# Patient Record
Sex: FEMALE | Race: WHITE | NOT HISPANIC OR LATINO | Employment: OTHER | URBAN - METROPOLITAN AREA
[De-identification: names, ages, dates, MRNs, and addresses within clinical notes are randomized per-mention and may not be internally consistent; named-entity substitution may affect disease eponyms.]

---

## 2024-03-14 ENCOUNTER — APPOINTMENT (EMERGENCY)
Dept: RADIOLOGY | Facility: HOSPITAL | Age: 64
DRG: 072 | End: 2024-03-14
Payer: MEDICARE

## 2024-03-14 ENCOUNTER — APPOINTMENT (EMERGENCY)
Dept: CT IMAGING | Facility: HOSPITAL | Age: 64
DRG: 072 | End: 2024-03-14
Payer: MEDICARE

## 2024-03-14 ENCOUNTER — HOSPITAL ENCOUNTER (INPATIENT)
Facility: HOSPITAL | Age: 64
LOS: 1 days | Discharge: HOME | DRG: 072 | End: 2024-03-15
Attending: EMERGENCY MEDICINE | Admitting: INTERNAL MEDICINE
Payer: MEDICARE

## 2024-03-14 DIAGNOSIS — R41.82 CHANGE IN MENTAL STATUS: Primary | ICD-10-CM

## 2024-03-14 LAB
2HR DELTA HS TROPONIN: 6 NG/L
ANION GAP SERPL CALCULATED.3IONS-SCNC: 7 MMOL/L (ref 4–13)
APTT PPP: 31 SECONDS (ref 23–37)
BUN SERPL-MCNC: 21 MG/DL (ref 5–25)
CALCIUM SERPL-MCNC: 9.7 MG/DL (ref 8.4–10.2)
CARDIAC TROPONIN I PNL SERPL HS: 3 NG/L
CARDIAC TROPONIN I PNL SERPL HS: 9 NG/L
CHLORIDE SERPL-SCNC: 104 MMOL/L (ref 96–108)
CO2 SERPL-SCNC: 29 MMOL/L (ref 21–32)
CREAT SERPL-MCNC: 0.73 MG/DL (ref 0.6–1.3)
ERYTHROCYTE [DISTWIDTH] IN BLOOD BY AUTOMATED COUNT: 12.4 % (ref 11.6–15.1)
FLUAV RNA RESP QL NAA+PROBE: NEGATIVE
FLUBV RNA RESP QL NAA+PROBE: NEGATIVE
GFR SERPL CREATININE-BSD FRML MDRD: 86 ML/MIN/1.73SQ M
GLUCOSE SERPL-MCNC: 88 MG/DL (ref 65–140)
GLUCOSE SERPL-MCNC: 96 MG/DL (ref 65–140)
HCT VFR BLD AUTO: 43.2 % (ref 34.8–46.1)
HGB BLD-MCNC: 13.9 G/DL (ref 11.5–15.4)
INR PPP: 0.94 (ref 0.84–1.19)
MCH RBC QN AUTO: 30.4 PG (ref 26.8–34.3)
MCHC RBC AUTO-ENTMCNC: 32.2 G/DL (ref 31.4–37.4)
MCV RBC AUTO: 95 FL (ref 82–98)
PLATELET # BLD AUTO: 268 THOUSANDS/UL (ref 149–390)
PMV BLD AUTO: 10.1 FL (ref 8.9–12.7)
POTASSIUM SERPL-SCNC: 3.9 MMOL/L (ref 3.5–5.3)
PROTHROMBIN TIME: 13 SECONDS (ref 11.6–14.5)
RBC # BLD AUTO: 4.57 MILLION/UL (ref 3.81–5.12)
RSV RNA RESP QL NAA+PROBE: NEGATIVE
SARS-COV-2 RNA RESP QL NAA+PROBE: NEGATIVE
SODIUM SERPL-SCNC: 140 MMOL/L (ref 135–147)
WBC # BLD AUTO: 8.09 THOUSAND/UL (ref 4.31–10.16)

## 2024-03-14 PROCEDURE — 82948 REAGENT STRIP/BLOOD GLUCOSE: CPT

## 2024-03-14 PROCEDURE — 84484 ASSAY OF TROPONIN QUANT: CPT | Performed by: EMERGENCY MEDICINE

## 2024-03-14 PROCEDURE — 99285 EMERGENCY DEPT VISIT HI MDM: CPT

## 2024-03-14 PROCEDURE — 85730 THROMBOPLASTIN TIME PARTIAL: CPT | Performed by: EMERGENCY MEDICINE

## 2024-03-14 PROCEDURE — 83036 HEMOGLOBIN GLYCOSYLATED A1C: CPT | Performed by: INTERNAL MEDICINE

## 2024-03-14 PROCEDURE — 0241U HB NFCT DS VIR RESP RNA 4 TRGT: CPT | Performed by: EMERGENCY MEDICINE

## 2024-03-14 PROCEDURE — 93005 ELECTROCARDIOGRAM TRACING: CPT

## 2024-03-14 PROCEDURE — 70498 CT ANGIOGRAPHY NECK: CPT

## 2024-03-14 PROCEDURE — 36415 COLL VENOUS BLD VENIPUNCTURE: CPT | Performed by: EMERGENCY MEDICINE

## 2024-03-14 PROCEDURE — 71045 X-RAY EXAM CHEST 1 VIEW: CPT

## 2024-03-14 PROCEDURE — 85610 PROTHROMBIN TIME: CPT | Performed by: EMERGENCY MEDICINE

## 2024-03-14 PROCEDURE — 99285 EMERGENCY DEPT VISIT HI MDM: CPT | Performed by: EMERGENCY MEDICINE

## 2024-03-14 PROCEDURE — NC001 PR NO CHARGE: Performed by: PSYCHIATRY & NEUROLOGY

## 2024-03-14 PROCEDURE — 70496 CT ANGIOGRAPHY HEAD: CPT

## 2024-03-14 PROCEDURE — 85027 COMPLETE CBC AUTOMATED: CPT | Performed by: EMERGENCY MEDICINE

## 2024-03-14 PROCEDURE — 80048 BASIC METABOLIC PNL TOTAL CA: CPT | Performed by: EMERGENCY MEDICINE

## 2024-03-14 PROCEDURE — 96374 THER/PROPH/DIAG INJ IV PUSH: CPT

## 2024-03-14 RX ORDER — ONDANSETRON 2 MG/ML
4 INJECTION INTRAMUSCULAR; INTRAVENOUS ONCE
Status: COMPLETED | OUTPATIENT
Start: 2024-03-14 | End: 2024-03-14

## 2024-03-14 RX ORDER — ASPIRIN 81 MG/1
324 TABLET, CHEWABLE ORAL ONCE
Status: COMPLETED | OUTPATIENT
Start: 2024-03-14 | End: 2024-03-14

## 2024-03-14 RX ADMIN — ASPIRIN 81 MG CHEWABLE TABLET 324 MG: 81 TABLET CHEWABLE at 23:06

## 2024-03-14 RX ADMIN — ONDANSETRON 4 MG: 2 INJECTION INTRAMUSCULAR; INTRAVENOUS at 20:45

## 2024-03-15 ENCOUNTER — APPOINTMENT (OUTPATIENT)
Dept: NON INVASIVE DIAGNOSTICS | Facility: HOSPITAL | Age: 64
DRG: 072 | End: 2024-03-15
Payer: MEDICARE

## 2024-03-15 ENCOUNTER — APPOINTMENT (INPATIENT)
Dept: MRI IMAGING | Facility: HOSPITAL | Age: 64
DRG: 072 | End: 2024-03-15
Payer: MEDICARE

## 2024-03-15 VITALS
DIASTOLIC BLOOD PRESSURE: 67 MMHG | BODY MASS INDEX: 28.61 KG/M2 | OXYGEN SATURATION: 97 % | HEIGHT: 66 IN | WEIGHT: 178 LBS | TEMPERATURE: 97.1 F | SYSTOLIC BLOOD PRESSURE: 129 MMHG | HEART RATE: 72 BPM | RESPIRATION RATE: 18 BRPM

## 2024-03-15 PROBLEM — R41.82 ALTERED MENTAL STATUS: Status: ACTIVE | Noted: 2024-03-15

## 2024-03-15 LAB
4HR DELTA HS TROPONIN: 3 NG/L
AORTIC ROOT: 3.1 CM
APICAL FOUR CHAMBER EJECTION FRACTION: 66 %
ASCENDING AORTA: 2.8 CM
ATRIAL RATE: 59 BPM
ATRIAL RATE: 65 BPM
BACTERIA UR QL AUTO: NORMAL /HPF
BILIRUB UR QL STRIP: NEGATIVE
BSA FOR ECHO PROCEDURE: 1.9 M2
CARDIAC TROPONIN I PNL SERPL HS: 6 NG/L
CHOLEST SERPL-MCNC: 169 MG/DL
CLARITY UR: CLEAR
COLOR UR: YELLOW
E WAVE DECELERATION TIME: 242 MS
E/A RATIO: 1.35
EST. AVERAGE GLUCOSE BLD GHB EST-MCNC: 111 MG/DL
FRACTIONAL SHORTENING: 36 (ref 28–44)
GLUCOSE UR STRIP-MCNC: NEGATIVE MG/DL
HBA1C MFR BLD: 5.5 %
HDLC SERPL-MCNC: 43 MG/DL
HGB UR QL STRIP.AUTO: NEGATIVE
INTERVENTRICULAR SEPTUM IN DIASTOLE (PARASTERNAL SHORT AXIS VIEW): 1 CM
INTERVENTRICULAR SEPTUM: 1 CM (ref 0.6–1.1)
KETONES UR STRIP-MCNC: NEGATIVE MG/DL
LAAS-AP2: 9 CM2
LAAS-AP4: 14.9 CM2
LDLC SERPL CALC-MCNC: 94 MG/DL (ref 0–100)
LEFT ATRIUM SIZE: 2.7 CM
LEFT ATRIUM VOLUME (MOD BIPLANE): 35 ML
LEFT ATRIUM VOLUME INDEX (MOD BIPLANE): 18.4 ML/M2
LEFT INTERNAL DIMENSION IN SYSTOLE: 2.8 CM (ref 2.1–4)
LEFT VENTRICLE DIASTOLIC VOLUME (MOD BIPLANE): 136 ML
LEFT VENTRICLE DIASTOLIC VOLUME INDEX (MOD BIPLANE): 71.6 ML/M2
LEFT VENTRICLE SYSTOLIC VOLUME (MOD BIPLANE): 47 ML
LEFT VENTRICLE SYSTOLIC VOLUME INDEX (MOD BIPLANE): 24.7 ML/M2
LEFT VENTRICULAR INTERNAL DIMENSION IN DIASTOLE: 4.4 CM (ref 3.5–6)
LEFT VENTRICULAR POSTERIOR WALL IN END DIASTOLE: 1 CM
LEFT VENTRICULAR STROKE VOLUME: 60 ML
LEUKOCYTE ESTERASE UR QL STRIP: NEGATIVE
LV EF: 65 %
LVSV (TEICH): 60 ML
MAGNESIUM SERPL-MCNC: 2 MG/DL (ref 1.9–2.7)
MV E'TISSUE VEL-LAT: 14 CM/S
MV E'TISSUE VEL-SEP: 13 CM/S
MV PEAK A VEL: 0.57 M/S
MV PEAK E VEL: 77 CM/S
MV STENOSIS PRESSURE HALF TIME: 70 MS
MV VALVE AREA P 1/2 METHOD: 3.1
NITRITE UR QL STRIP: NEGATIVE
NON-SQ EPI CELLS URNS QL MICRO: NORMAL /HPF
P AXIS: 64 DEGREES
P AXIS: 78 DEGREES
PH UR STRIP.AUTO: 6 [PH]
PR INTERVAL: 124 MS
PR INTERVAL: 146 MS
PROT UR STRIP-MCNC: ABNORMAL MG/DL
QRS AXIS: 73 DEGREES
QRS AXIS: 80 DEGREES
QRSD INTERVAL: 88 MS
QRSD INTERVAL: 90 MS
QT INTERVAL: 384 MS
QT INTERVAL: 418 MS
QTC INTERVAL: 399 MS
QTC INTERVAL: 413 MS
RA PRESSURE ESTIMATED: 3 MMHG
RBC #/AREA URNS AUTO: NORMAL /HPF
RIGHT ATRIUM AREA SYSTOLE A4C: 16.1 CM2
RIGHT VENTRICLE ID DIMENSION: 3.8 CM
RV PSP: 25 MMHG
SL CV LEFT ATRIUM LENGTH A2C: 2.9 CM
SL CV LV EF: 60
SL CV PED ECHO LEFT VENTRICLE DIASTOLIC VOLUME (MOD BIPLANE) 2D: 88 ML
SL CV PED ECHO LEFT VENTRICLE SYSTOLIC VOLUME (MOD BIPLANE) 2D: 29 ML
SP GR UR STRIP.AUTO: 1.02 (ref 1–1.03)
T WAVE AXIS: 39 DEGREES
T WAVE AXIS: 66 DEGREES
TR MAX PG: 22 MMHG
TR PEAK VELOCITY: 2.3 M/S
TRICUSPID ANNULAR PLANE SYSTOLIC EXCURSION: 2.2 CM
TRICUSPID VALVE PEAK REGURGITATION VELOCITY: 2.34 M/S
TRIGL SERPL-MCNC: 161 MG/DL
UROBILINOGEN UR STRIP-ACNC: <2 MG/DL
VENTRICULAR RATE: 59 BPM
VENTRICULAR RATE: 65 BPM
WBC #/AREA URNS AUTO: NORMAL /HPF

## 2024-03-15 PROCEDURE — 93010 ELECTROCARDIOGRAM REPORT: CPT | Performed by: INTERNAL MEDICINE

## 2024-03-15 PROCEDURE — 80061 LIPID PANEL: CPT | Performed by: HOSPITALIST

## 2024-03-15 PROCEDURE — 93005 ELECTROCARDIOGRAM TRACING: CPT

## 2024-03-15 PROCEDURE — 83735 ASSAY OF MAGNESIUM: CPT | Performed by: HOSPITALIST

## 2024-03-15 PROCEDURE — 99205 OFFICE O/P NEW HI 60 MIN: CPT | Performed by: STUDENT IN AN ORGANIZED HEALTH CARE EDUCATION/TRAINING PROGRAM

## 2024-03-15 PROCEDURE — RECHECK: Performed by: INTERNAL MEDICINE

## 2024-03-15 PROCEDURE — 93306 TTE W/DOPPLER COMPLETE: CPT

## 2024-03-15 PROCEDURE — 70551 MRI BRAIN STEM W/O DYE: CPT

## 2024-03-15 PROCEDURE — 84484 ASSAY OF TROPONIN QUANT: CPT | Performed by: EMERGENCY MEDICINE

## 2024-03-15 PROCEDURE — 99223 1ST HOSP IP/OBS HIGH 75: CPT | Performed by: INTERNAL MEDICINE

## 2024-03-15 PROCEDURE — 93306 TTE W/DOPPLER COMPLETE: CPT | Performed by: INTERNAL MEDICINE

## 2024-03-15 PROCEDURE — 81001 URINALYSIS AUTO W/SCOPE: CPT | Performed by: INTERNAL MEDICINE

## 2024-03-15 RX ORDER — LORAZEPAM 2 MG/ML
0.5 INJECTION INTRAMUSCULAR ONCE
Status: COMPLETED | OUTPATIENT
Start: 2024-03-15 | End: 2024-03-15

## 2024-03-15 RX ORDER — ONDANSETRON 2 MG/ML
4 INJECTION INTRAMUSCULAR; INTRAVENOUS EVERY 4 HOURS PRN
Status: DISCONTINUED | OUTPATIENT
Start: 2024-03-15 | End: 2024-03-15 | Stop reason: HOSPADM

## 2024-03-15 RX ORDER — ASPIRIN 81 MG/1
81 TABLET ORAL DAILY
COMMUNITY
End: 2024-03-15

## 2024-03-15 RX ORDER — ATORVASTATIN CALCIUM 40 MG/1
40 TABLET, FILM COATED ORAL EVERY EVENING
Status: DISCONTINUED | OUTPATIENT
Start: 2024-03-15 | End: 2024-03-15 | Stop reason: HOSPADM

## 2024-03-15 RX ORDER — ACETAMINOPHEN 325 MG/1
650 TABLET ORAL EVERY 6 HOURS PRN
Status: DISCONTINUED | OUTPATIENT
Start: 2024-03-15 | End: 2024-03-15 | Stop reason: HOSPADM

## 2024-03-15 RX ADMIN — LORAZEPAM 0.5 MG: 2 INJECTION INTRAMUSCULAR; INTRAVENOUS at 15:56

## 2024-03-15 RX ADMIN — ATORVASTATIN CALCIUM 40 MG: 40 TABLET, FILM COATED ORAL at 17:21

## 2024-03-15 RX ADMIN — ONDANSETRON 4 MG: 2 INJECTION INTRAMUSCULAR; INTRAVENOUS at 08:14

## 2024-03-15 RX ADMIN — ASPIRIN 81 MG: 81 TABLET, COATED ORAL at 08:13

## 2024-03-15 NOTE — ED PROVIDER NOTES
History  Chief Complaint   Patient presents with    STROKE Alert     Per ems  reported notice wife AMS at 1900. Pt became repetitive and didn't remember getting changed or where she was.      63F presents via EMS for acute mental status change.  Per EMS, patient was home and her  noted her to become acutely confused. EMS states that the patient became repetitive with the  and could not remember any thing she did that day and was unable to answer simple questions. EMS did not note any motor or sensory deficit. No witnessed seizure activity at home or by EMS. BS normal for EMS      STROKE Alert      Prior to Admission Medications   Prescriptions Last Dose Informant Patient Reported? Taking?   aspirin (Aspirin 81) 81 mg EC tablet   Yes Yes   Sig: Take 81 mg by mouth daily      Facility-Administered Medications: None       History reviewed. No pertinent past medical history.    History reviewed. No pertinent surgical history.    History reviewed. No pertinent family history.  I have reviewed and agree with the history as documented.    E-Cigarette/Vaping    E-Cigarette Use Never User      E-Cigarette/Vaping Substances     Social History     Tobacco Use    Smoking status: Never    Smokeless tobacco: Never   Vaping Use    Vaping status: Never Used   Substance Use Topics    Alcohol use: Not Currently    Drug use: Never       Review of Systems    Physical Exam  Physical Exam  Vitals and nursing note reviewed.   Constitutional:       General: She is not in acute distress.     Appearance: She is well-developed.   HENT:      Head: Normocephalic and atraumatic.      Right Ear: External ear normal.      Left Ear: External ear normal.   Eyes:      General: No visual field deficit or scleral icterus.     Conjunctiva/sclera: Conjunctivae normal.      Pupils: Pupils are equal, round, and reactive to light.   Cardiovascular:      Rate and Rhythm: Normal rate and regular rhythm.      Heart sounds: Normal heart  sounds.   Pulmonary:      Effort: Pulmonary effort is normal. No respiratory distress.      Breath sounds: Normal breath sounds.   Abdominal:      General: Bowel sounds are normal.      Palpations: Abdomen is soft.      Tenderness: There is no abdominal tenderness. There is no guarding or rebound.   Musculoskeletal:         General: Normal range of motion.      Cervical back: Normal range of motion.   Skin:     General: Skin is warm and dry.      Findings: No rash.   Neurological:      Mental Status: She is alert and oriented to person, place, and time. She is confused.      Cranial Nerves: Cranial nerves 2-12 are intact. No cranial nerve deficit, dysarthria or facial asymmetry.      Sensory: Sensation is intact.      Motor: Motor function is intact.      Coordination: Coordination is intact.   Psychiatric:         Attention and Perception: She is attentive.         Mood and Affect: Mood is not anxious. Affect is flat.         Behavior: Behavior normal. Behavior is cooperative.         Vital Signs  ED Triage Vitals   Temperature Pulse Respirations Blood Pressure SpO2   03/14/24 2020 03/14/24 2020 03/14/24 2020 03/14/24 2020 03/14/24 2020   98 °F (36.7 °C) 75 18 (!) 171/72 98 %      Temp Source Heart Rate Source Patient Position - Orthostatic VS BP Location FiO2 (%)   03/14/24 2020 03/14/24 2020 03/15/24 0907 03/14/24 2023 --   Oral Monitor Lying Right arm       Pain Score       03/14/24 2358       No Pain           Vitals:    03/15/24 0007 03/15/24 0705 03/15/24 0907 03/15/24 0907   BP: 138/60 138/60 104/61 104/61   Pulse: 64 57 67 66   Patient Position - Orthostatic VS:    Lying         Visual Acuity  Visual Acuity      Flowsheet Row Most Recent Value   L Pupil Size (mm) 3   R Pupil Size (mm) 3   L Pupil Shape Round   R Pupil Shape Round            ED Medications  Medications   aspirin (ECOTRIN LOW STRENGTH) EC tablet 81 mg (81 mg Oral Given 3/15/24 0813)   atorvastatin (LIPITOR) tablet 40 mg (has no administration  in time range)   acetaminophen (TYLENOL) tablet 650 mg (has no administration in time range)   ondansetron (ZOFRAN) injection 4 mg (4 mg Intravenous Given 3/15/24 0814)   ondansetron (ZOFRAN) injection 4 mg (4 mg Intravenous Given 3/14/24 2045)   aspirin chewable tablet 324 mg (324 mg Oral Given 3/14/24 2306)       Diagnostic Studies  Results Reviewed       Procedure Component Value Units Date/Time    Hemoglobin A1c w/EAG Estimation [[id number]] Collected: 03/14/24 2025    Lab Status: Final result Specimen: Blood from Arm, Right Updated: 03/15/24 0756     Hemoglobin A1C 5.5 %       mg/dl     HS Troponin I 4hr [[id number]]  (Normal) Collected: 03/15/24 0053    Lab Status: Final result Specimen: Blood from Arm, Left Updated: 03/15/24 0121     hs TnI 4hr 6 ng/L      Delta 4hr hsTnI 3 ng/L     HS Troponin I 2hr [[id number]]  (Normal) Collected: 03/14/24 2311    Lab Status: Final result Specimen: Blood from Arm, Right Updated: 03/14/24 2346     hs TnI 2hr 9 ng/L      Delta 2hr hsTnI 6 ng/L     Protime-INR [[id number]]  (Normal) Collected: 03/14/24 2132    Lab Status: Final result Specimen: Blood from Arm, Right Updated: 03/14/24 2152     Protime 13.0 seconds      INR 0.94    APTT [[id number]]  (Normal) Collected: 03/14/24 2132    Lab Status: Final result Specimen: Blood from Arm, Right Updated: 03/14/24 2152     PTT 31 seconds     FLU/RSV/COVID - if FLU/RSV clinically relevant [[id number]]  (Normal) Collected: 03/14/24 2025    Lab Status: Final result Specimen: Nares from Nose Updated: 03/14/24 2148     SARS-CoV-2 Negative     INFLUENZA A PCR Negative     INFLUENZA B PCR Negative     RSV PCR Negative    Narrative:      FOR PEDIATRIC PATIENTS - copy/paste COVID Guidelines URL to browser: [email or web address]    SARS-CoV-2 assay is a Nucleic Acid Amplification assay intended for the  qualitative detection of nucleic acid from SARS-CoV-2 in nasopharyngeal  swabs.  Results are for the presumptive identification of SARS-CoV-2 RNA.    Positive results are indicative of infection with SARS-CoV-2, the virus  causing COVID-19, but do not rule out bacterial infection or co-infection  with other viruses. Laboratories within the [place] and its  territories are required to report all positive results to the appropriate  public health authorities. Negative results do not preclude SARS-CoV-2  infection and should not be used as the sole basis for treatment or other  patient management decisions. Negative results must be combined with  clinical observations, patient history, and epidemiological information.  This test has not been FDA cleared or approved.    This test has been authorized by FDA under an Emergency Use Authorization  (EUA). This test is only authorized for the duration of time the  declaration that circumstances exist justifying the authorization of the  emergency use of an in vitro diagnostic tests for detection of SARS-CoV-2  virus and/or diagnosis of COVID-19 infection under section 564(b)(1) of  the Act, 21 U.S.C. 360bbb-3(b)(1), unless the authorization is terminated  or revoked sooner. The test has been validated but independent review by FDA  and CLIA is pending.    Test performed using [employer] GeneXpert: This RT-PCR assay targets N2,  a region unique to SARS-CoV-2. A conserved region in the E-gene was chosen  for pan-Sarbecovirus detection which includes SARS-CoV-2.    According to CMS-2020-01-R, this platform meets the definition of high-throughput technology.    HS Troponin 0hr (reflex protocol) [[id number]]  (Normal) Collected: 03/14/24 2025    Lab Status: Final result Specimen: Blood from Arm, Right Updated: 03/14/24 2129     hs TnI 0hr 3 ng/L     Basic metabolic panel [[id number]] Collected: 03/14/24 2025    Lab Status: Final result Specimen: Blood from Arm, Right Updated: 03/14/24 2120     Sodium 140 mmol/L      Potassium 3.9 mmol/L      Chloride 104  mmol/L      CO2 29 mmol/L      ANION GAP 7 mmol/L      BUN 21 mg/dL      Creatinine 0.73 mg/dL      Glucose 88 mg/dL      Calcium 9.7 mg/dL      eGFR 86 ml/min/1.73sq m     Narrative:      National Kidney Disease Foundation guidelines for Chronic Kidney Disease (CKD):     Stage 1 with normal or high GFR (GFR > 90 mL/min/1.73 square meters)    Stage 2 Mild CKD (GFR = 60-89 mL/min/1.73 square meters)    Stage 3A Moderate CKD (GFR = 45-59 mL/min/1.73 square meters)    Stage 3B Moderate CKD (GFR = 30-44 mL/min/1.73 square meters)    Stage 4 Severe CKD (GFR = 15-29 mL/min/1.73 square meters)    Stage 5 End Stage CKD (GFR <15 mL/min/1.73 square meters)  Note: GFR calculation is accurate only with a steady state creatinine    CBC and Platelet [[id number]]  (Normal) Collected: 03/14/24 2025    Lab Status: Final result Specimen: Blood from Arm, Right Updated: 03/14/24 2105     WBC 8.09 Thousand/uL      RBC 4.57 Million/uL      Hemoglobin 13.9 g/dL      Hematocrit 43.2 %      MCV 95 fL      MCH 30.4 pg      MCHC 32.2 g/dL      RDW 12.4 %      Platelets 268 Thousands/uL      MPV 10.1 fL     Fingerstick Glucose (POCT) [[id number]]  (Normal) Collected: 03/14/24 2024    Lab Status: Final result Specimen: Blood Updated: 03/14/24 2025     POC Glucose 96 mg/dl                    CT stroke alert brain   Final Result by Osmel Padgett MD (03/14 2202)      No evidence of acute vascular territorial infarction, intracranial hemorrhage or mass.      Findings were directly discussed with at approximately .      Workstation performed: [id number]         CTA stroke alert (head/neck)   Final Result by Osmel Padgett MD (03/14 2202)      No hemodynamically significant stenosis, dissection or occlusion of the carotid or vertebral arteries or major vessels of the [place].            Findings were directly discussed with  Geneva Bansal   at  10:00 PM  .                           Workstation performed: [id number]          X-ray chest 1 view portable   ED Interpretation by Harry Gutierrez DO (03/14 2106)   My X-ray interpretation of the Chest: was negative for infiltrate, effusion, pneumothorax, or wide mediastinum      Final Result by Kasey gNuyen MD (03/15 1037)      No acute cardiopulmonary disease.            Workstation performed: [id number]         MRI Inpatient Order    (Results Pending)              Procedures  Procedures         ED Course  ED Course as of 03/15/24 1045   Thu Mar 14, 2024   2018 Stroke alert called for confusion beginning acutely at 7p. No focal deficit   2027 Discussed the case with Dr. Bansal, agrees with plan for stroke workup with rapid imaging.   2040 Patient vomiting in CT, zofran ordered   2052 Patient sat up during CTA. Message sent to neuro regarding incomplete CTA     2057 Per discussion with neurology, will reattempt CTA due to concern for transient global amnesia                               SBIRT 20yo+      Flowsheet Row Most Recent Value   Initial Alcohol Screen: US AUDIT-C     1. How often do you have a drink containing alcohol? 0 Filed at: 03/14/2024 2015   2. How many drinks containing alcohol do you have on a typical day you are drinking?  0 Filed at: 03/14/2024 2015   3a. Male UNDER 65: How often do you have five or more drinks on one occasion? 0 Filed at: 03/14/2024 2015   3b. FEMALE Any Age, or MALE 65+: How often do you have 4 or more drinks on one occassion? 0 Filed at: 03/14/2024 2015   Audit-C Score 0 Filed at: 03/14/2024 2015   DANNI: How many times in the past year have you...    Used an illegal drug or used a prescription medication for non-medical reasons? Never Filed at: 03/14/2024 2015                      Medical Decision Making  DDx: stroke, SAH, SDH, epidural, conversion disorder, seizure, transient global amnesia, dehydration, electrolyte disorder    Plan for stroke workup with stat neuro imaging and neuro consultation. Will need admit under medical  service. I discussed with Za from [facility]    Amount and/or Complexity of Data Reviewed  Independent Historian: EMS  Labs: ordered.  Radiology: ordered and independent interpretation performed.    Risk  Prescription drug management.  Decision regarding hospitalization.             Disposition  Final diagnoses:   Change in mental status     Time reflects when diagnosis was documented in both MDM as applicable and the Disposition within this note       Time User Action Codes Description Comment    3/14/2024  8:19 PM Harry Gutierrez Add [R41.82] Change in mental status           ED Disposition       ED Disposition   Admit    Condition   Stable    Date/Time   u Mar 14, 2024 2935    Comment   Case was discussed with [facility] and the patient's admission status was agreed to be Admission Status: observation status to the service of Dr. Amaya .               Follow-up Information    None         Current Discharge Medication List        CONTINUE these medications which have NOT CHANGED    Details   aspirin (Aspirin 81) 81 mg EC tablet Take 81 mg by mouth daily             No discharge procedures on file.    PDMP Review       None            ED Provider  Electronically Signed by             Harry Gutierrez DO  03/15/24 8898

## 2024-03-15 NOTE — PLAN OF CARE
Problem: PAIN - ADULT  Goal: Verbalizes/displays adequate comfort level or baseline comfort level  Description: Interventions:  - Encourage patient to monitor pain and request assistance  - Assess pain using appropriate pain scale  - Administer analgesics based on type and severity of pain and evaluate response  - Implement non-pharmacological measures as appropriate and evaluate response  - Consider cultural and social influences on pain and pain management  - Notify physician/advanced practitioner if interventions unsuccessful or patient reports new pain  Outcome: Progressing     Problem: INFECTION - ADULT  Goal: Absence or prevention of progression during hospitalization  Description: INTERVENTIONS:  - Assess and monitor for signs and symptoms of infection  - Monitor lab/diagnostic results  - Monitor all insertion sites, i.e. indwelling lines, tubes, and drains  - Monitor endotracheal if appropriate and nasal secretions for changes in amount and color  - [place] appropriate cooling/warming therapies per order  - Administer medications as ordered  - Instruct and encourage patient and family to use good hand hygiene technique  - Identify and instruct in appropriate isolation precautions for identified infection/condition  Outcome: Progressing  Goal: Absence of fever/infection during neutropenic period  Description: INTERVENTIONS:  - Monitor WBC    Outcome: Progressing

## 2024-03-15 NOTE — DISCHARGE INSTR - AVS FIRST PAGE
For your PCP:    Mrs. Wallace was admitted for acute onset confusion. Placed on stroke pathway.    MRI as follow:  1. Mild, chronic microangiopathy.  2. No acute infarction, intracranial hemorrhage or midline shift  2. Small retrocerebellar arachnoid cyst on the left.    Echo: EF 60%, normal diastolic function, no valvular issues    Neurology evaluated and believe this was an episode of Transient Global Amnesia and does not require any follow up with neurology. Lipid panel was good therefore she does not require aspirin or statin.

## 2024-03-15 NOTE — ASSESSMENT & PLAN NOTE
Presented to [facility] at about 1900 on 3/14 due to sudden confusion. Per  at bedside patient at baseline throughout the day. No recent illness other than covid in January. Last known normal when she was outside tending to weeds around a bush. Had only been out there for about 20 minutes.   Initially in the ER very confused. Unable to recall what had happened, where she was, year or president. Over the next couple of hours confusion waxing and waning.   At time of admission oriented to person, place and year. States jorge is president. Unable to recall being in the ER or being at home tonight. Remebers tending to the bush by her house.   Takes no medications other than aspirin 81 mg.   Family history of CVA. No prior CVA or tia herself.   CTA head and neck   No hemodynamically significant stenosis, dissection or occlusion of the carotid or vertebral arteries or major vessels of the [place].   MRI  Echocardiogram normal with a EF of 60%  DC aspirin, Plavix, statin-normal cholesterol

## 2024-03-15 NOTE — ASSESSMENT & PLAN NOTE
63 year old female with no prior neurological history per chart review, no prior significant medical history, reported hx of depression not on medication currently,  per chart review who presents to [facility] ED for evaluation of acutely developing a confusional state.  The patient was brought by her  at around 7 PM when she developed decreased awareness and repetitive questioning and unable to recall some events and conversation.  Per record review there was no other focal neurological dysfunction.  No reported seizure activity loss of consciousness head injury or other complaints prior to this event.     A stroke alert was activated, please refer to the stroke alert note completed by neurology.  INH was a 1.  CT of the head showed no acute intracranial abnormalities CTA of the head and neck showed no LVO, as noted below.  The patient was not a TNK candidate as symptoms are nondisabling.  There was a concern for a possible stroke versus TGA per on call neurologist overnight.   The patient was loaded with aspirin and placed on the stroke pathway.     VS on arrival blood pressure was 171/72 mmhg, the patient was afebrile, pulse was 75.    CT of head -No evidence of acute vascular territorial infarction, intracranial hemorrhage or mass.   CTA of head and neck - No hemodynamically significant stenosis, dissection or occlusion of the carotid or vertebral arteries or major vessels of the [place].     Labs/results:  Glucose was 96 on arrival  BMP normal  CMP normal  Flu/RSV/COVID normal  APTT/INR normal  Troponin normal  Mg normal  UA pending  A1c pending  LDL 94    DDX - Suspect TGA, less likely stroke (MRI of brain to clarify), no seizure activity reported (does not appear seizure), the patient was HTN on arrival.  No infectious/metabolic derangements identified at this time.    - Admitted on stroke pathway  MRI brain with out contrast  Hemoglobin A1c  Aspirin 81 mg, the patient was loaded  in the ED (she takes aspirin at home)  Atorvastatin 40 mg, if MRI negative for stroke no need for statin from neuro-perspective  Blood pressure control, goal normotensive  Continue telemetry  PT/OT/ST  No need for EEG at this time, no reported seizure like activity with this event   Frequent neuro checks. Continue to monitor and notify neurology with any changes.   - Medical management and supportive care per primary team. Correction of any metabolic or infectious disturbances.    - Please see attestation for details.   - If MRI negative for stroke no further neurological recommendations.

## 2024-03-15 NOTE — CONSULTS
INTERPROFESSIONAL (PHONE) CONSULTATION   Christi Wallace 63 y.o. female MRN: [id number]  Encounter Date: 03/14/24      Reason for Consult / Principal Problem: stroke alert     Consulting Provider: LIGIA Bansal     Requesting Provider: Dr. Gutierrez       ASSESSMENT:  Mrs. Wallace is a 64 yo female who was brought to [facility] ED for evaluation of acutely developed confusional state. Patient was brought by her  as around 7 pm patient had developed decreased awareness with repetitive questioning and unable to recall some events/conversation. Patient does not have any focal neurologic dysfunction.   No records available for review.   No seizure activity or LOC/head injury described.     Time stroke alert was called: 8:22 pm  Time neurology provider spoke with the ED:  8:23 pm  Patient Last Known Well (LKW) 7:00 pm  BP upon arrival to ED: 171/72 mmHg  PT/INR/aPTT/BS/Platelets: 97/268  NIHS Score: 1    CTH: no acute intracranial pathology noted  CTA head/neck: no LVO noted, no aneurysm/dissection noted    RECOMMENDATIONS:  IV thrombolysis was not given due to patient symptoms are non-disabling. Concern was for TGA vs hippocampal stroke.     Recommend loading aspirin 325 mg PO once, then 81 mg daily  Goal MAP> 100, and SBP < 210 mmHg  LDL Goal <70 mg/dL  Admit to stroke pathway    Total time spent in review of data, discussion with requesting provider and rendering advice was  21-30 minutes, >50% of the total time devoted to medical consultative verbal/EMR discussion between providers. Written report will be generated in the EMR.

## 2024-03-15 NOTE — DISCHARGE SUMMARY
[facility]  Discharge- Christi Wlalace 1960, 63 y.o. female MRN: [id number]  Unit/Bed#: [facility] [place] Encounter: [id number]  Primary Care Provider: Sona Schwartz MD   Date and time admitted to hospital: 3/14/2024  8:09 PM    * Altered mental status - suspect TGA (transient global amenesia)  Assessment & Plan  Presented to [facility] at about 1900 on 3/14 due to sudden confusion. Per  at bedside patient at baseline throughout the day. No recent illness other than covid in January. Last known normal when she was outside tending to weeds around a bush. Had only been out there for about 20 minutes.   Initially in the ER very confused. Unable to recall what had happened, where she was, year or president. Over the next couple of hours confusion waxing and waning.   At time of admission oriented to person, place and year. States jorge is president. Unable to recall being in the ER or being at home tonight. Remebers tending to the bush by her house.   Takes no medications other than aspirin 81 mg.   Family history of CVA. No prior CVA or tia herself.   CTA head and neck   No hemodynamically significant stenosis, dissection or occlusion of the carotid or vertebral arteries or major vessels of the [place].   MRI  Echocardiogram normal with a EF of 60%  DC aspirin, Plavix, statin-normal cholesterol          Medical Problems       Resolved Problems  Date Reviewed: 3/15/2024   None       Discharging Physician / Practitioner: Duncan Mallory MD  PCP: Sona Schwartz MD  Admission Date:   Admission Orders (From admission, onward)       Ordered        03/15/24 1536  Inpatient Admission  Once            03/14/24 2235  Place in Observation  Once                          Discharge Date: 03/15/24    Consultations During Hospital Stay:  Neurology    Procedures Performed:   None    Significant Findings / Test Results:   X-ray chest 1 view portable    Result Date:  "3/15/2024  Impression: No acute cardiopulmonary disease. Workstation performed: [id number]     CT stroke alert brain    Result Date: 3/14/2024  Impression: No evidence of acute vascular territorial infarction, intracranial hemorrhage or mass. Findings were directly discussed with at approximately . Workstation performed: [id number]     CTA stroke alert (head/neck)    Result Date: 3/14/2024  Impression: No hemodynamically significant stenosis, dissection or occlusion of the carotid or vertebral arteries or major vessels of the [place]. Findings were directly discussed with  Geneva Bansal   at  10:00 PM  . Workstation performed: [id number]       X-ray chest 1 view portable    Result Date: 3/15/2024  Impression No acute cardiopulmonary disease. Workstation performed: [id number]         Incidental Findings:   None    Reason for Admission: Altered mental status    Hospital Course:   Christi Wallace is a 63 y.o. female patient who originally presented to the hospital on 3/14/2024 due to mental status.  Stroke alert was activated.  NIH score 1 on presentation.  CT, CTA showed no acute intracranial abnormalities.  Patient was not a TNK candidate.  Patient was loaded with aspirin and placed on stroke pathway.  MRI done.  Echo done, findings as above.  Discussed with patient and  bedside likely this is presentation of transient global amnesia        Please see above list of diagnoses and related plan for additional information.     Condition at Discharge: stable    Discharge Day Visit / Exam:   Subjective: Patient seen and examined at bedside, alert, oriented x 3  Vitals: Blood Pressure: 129/67 (03/15/24 1539)  Pulse: 72 (03/15/24 1539)  Temperature: (!) 97.1 °F (36.2 °C) (03/15/24 1539)  Temp Source: Oral (03/15/24 1539)  Respirations: 18 (03/15/24 1539)  Height: 5' 6\" (167.6 cm) (03/15/24 0705)  Weight - Scale: 80.7 kg (178 lb) (03/15/24 0705)  SpO2: 97 % (03/15/24 1539)  Exam:   Physical Exam "     Gen.-Patient comfortable   Neck- Supple. No thyromegaly or lymphadenopathy  Lungs-Clear bilaterally without any wheeze or rales   Heart S1-S2, regular rate and rhythm, no murmurs  Abdomen-soft nontender, no organomegaly. Bowel sounds present  Extremities-no cyanosi,  clubbing or edema  Skin- no rash  Neuro-nonfocal     Discussion with Family: Updated  () at bedside.    Discharge instructions/Information to patient and family:   See after visit summary for information provided to patient and family.      Provisions for Follow-Up Care:  See after visit summary for information related to follow-up care and any pertinent home health orders.      Mobility at time of Discharge:   Basic Mobility Inpatient Raw Score: 17  JH-HLM Goal: 5: Stand one or more mins  JH-HLM Achieved: 7: Walk 25 feet or more  HLM Goal achieved. Continue to encourage appropriate mobility.     Disposition:   Home    Planned Readmission: None     Discharge Statement:  I spent 32 minutes discharging the patient. This time was spent on the day of discharge. I had direct contact with the patient on the day of discharge. Greater than 50% of the total time was spent examining patient, answering all patient questions, arranging and discussing plan of care with patient as well as directly providing post-discharge instructions.  Additional time then spent on discharge activities.    Discharge Medications:  See after visit summary for reconciled discharge medications provided to patient and/or family.      **Please Note: This note may have been constructed using a voice recognition system**

## 2024-03-15 NOTE — CONSULTS
Consultation - Neurology   Christi Wallace 63 y.o. female MRN: [id number]  Unit/Bed#: -01 Encounter: [id number]    Assessment/Plan   * Altered mental status - suspect TGA (transient global amenesia)  Assessment & Plan  63 year old female with no prior neurological history per chart review, no prior significant medical history, reported hx of depression not on medication currently,  per chart review who presents to [facility] ED for evaluation of acutely developing a confusional state.  The patient was brought by her  at around 7 PM when she developed decreased awareness and repetitive questioning and unable to recall some events and conversation.  Per record review there was no other focal neurological dysfunction.  No reported seizure activity loss of consciousness head injury or other complaints prior to this event.     A stroke alert was activated, please refer to the stroke alert note completed by neurology.  INH was a 1.  CT of the head showed no acute intracranial abnormalities CTA of the head and neck showed no LVO, as noted below.  The patient was not a TNK candidate as symptoms are nondisabling.  There was a concern for a possible stroke versus TGA per on call neurologist overnight.   The patient was loaded with aspirin and placed on the stroke pathway.     VS on arrival blood pressure was 171/72 mmhg, the patient was afebrile, pulse was 75.    CT of head -No evidence of acute vascular territorial infarction, intracranial hemorrhage or mass.   CTA of head and neck - No hemodynamically significant stenosis, dissection or occlusion of the carotid or vertebral arteries or major vessels of the [place].     Labs/results:  Glucose was 96 on arrival  BMP normal  CMP normal  Flu/RSV/COVID normal  APTT/INR normal  Troponin normal  Mg normal  UA pending  A1c pending  LDL 94    DDX - Suspect TGA, less likely stroke (MRI of brain to clarify), no seizure activity reported (does not  appear seizure), the patient was HTN on arrival.  No infectious/metabolic derangements identified at this time.    - Admitted on stroke pathway  MRI brain with out contrast  Hemoglobin A1c  Aspirin 81 mg, the patient was loaded in the ED (she takes aspirin at home)  Atorvastatin 40 mg, if MRI negative for stroke no need for statin from neuro-perspective  Blood pressure control, goal normotensive  Continue telemetry  PT/OT/ST  No need for EEG at this time, no reported seizure like activity with this event   Frequent neuro checks. Continue to monitor and notify neurology with any changes.   - Medical management and supportive care per primary team. Correction of any metabolic or infectious disturbances.    - Please see attestation for details.   - If MRI negative for stroke no further neurological recommendations.      Christi Wallace will need follow up in in 6 weeks with neurovascular attending. She will not require outpatient neurological testing.    History of Present Illness     Reason for Consult / Principal Problem:   Change in mental status    HPI: Christi Wallace is a 63 y.o. with no prior neurological history per chart review, no prior significant medical history per chart review, she reports hx of depression on no medications,  who presents to [facility] ED for evaluation of acutely developing a confusional state.  The patient was brought by her  at around 7 PM when she developed decreased awareness and repetitive questioning and unable to recall some events and conversation.  Per record review there was no other focal neurological dysfunction.  No reported seizure activity loss of consciousness head injury or other complaints prior to this event.     A stroke alert was activated, please refer to the stroke alert note completed by neurology.  INH was a 1.  CT of the head showed no acute intracranial abnormalities CTA of the head and neck showed no LVO, as noted below.  The patient was not a  TNK candidate as symptoms are nondisabling.  There was a concern for a possible stroke versus TGA per on call neurologist overnight.   The patient was loaded with aspirin and placed on the stroke pathway.     VS on arrival blood pressure was 171/72 mmhg, the patient was afebrile, pulse was 75.    CT of head -No evidence of acute vascular territorial infarction, intracranial hemorrhage or mass.   CTA of head and neck - No hemodynamically significant stenosis, dissection or occlusion of the carotid or vertebral arteries or major vessels of the [place].     Labs/results:  Glucose was 96 on arrival  BMP normal  CMP normal  Flu/RSV/COVID normal  APTT/INR normal  Troponin normal  Mg normal  UA pending  A1c pending  LDL 94    The patient was seen by the medical team and neurology was consulted for further recommendations, the patient is on the stroke pathway.     Per patient encounter -  The patient reports she was in the yard doing yard work, digging out a bush/gardening (around 7 p.m.).  That's all she remembers until 10 o'clock last night.  She was confused when she came too at 10 in the evening, she was still confused.  No ha, dizziness, nausea or vomiting, no dizziness or changing speech, she was a little confused afterwards.  She has no weakness, numbness or tingling.  This a.m. she feels slow and tired.  She feels like she is back to normal, she is tired.  She reports the night before this event, she had acid reflux.  She was fine through the day. She reports earlier in the day she was feeling normal. She had a fight that day and had an argument with her .   She is under a lot of stress, as there daughter is getting  at the house and they are prepping the yard for the wedding.     Per  - there was an argument on the yard and what to do next in preparation and what to do gardening.  They were in a disagreement, 20 minutes away from each other, he noticed her digging under the deck, she  seemed fine.   She found her and she was not acting her self/confused and thought she was having a stroke.  She gave him 2 aspirin.  She was confused and her memory was not current in the past, five years in the past.  No focal findings on examination.  She was able to speak, five year lapse of time.   In the ED was nauseous, she was talking normal, no aphasia.  She was repetitive.  She would repeat in minutes.  She started getting her memory back in the hospital, there was a window of time from 7 p.m. to 10 p.m.  Her memories are coming back.  No abnormal smells were reported.     This never happened before, no prior neurological hx.  She does have a hx of depression and was taking off medications, no hx of migraine recently.     She had covid end of Jan, with sense of smell loss but returned.   No recreation drug use  No tobacco abuse  2 beers a night or wine, maybe 1-2 nights a week.     Inpatient consult to Neurology  Consult performed by: Sean Fried PA-C  Consult ordered by: Za Maxwell PA-C      Inpatient consult to Neurology  Consult performed by: Sean Fried PA-C  Consult ordered by: Harry Gutierrez DO        Review of Systems  12 point ROS as per HPI, otherwise negative.     Historical Information   History reviewed. No pertinent past medical history.  History reviewed. No pertinent surgical history.  Social History   Social History     Substance and Sexual Activity   Alcohol Use Not Currently     Social History     Substance and Sexual Activity   Drug Use Never     E-Cigarette/Vaping    E-Cigarette Use Never User      E-Cigarette/Vaping Substances     Social History     Tobacco Use   Smoking Status Never   Smokeless Tobacco Never     Family History: History reviewed. No pertinent family history.    Review of previous medical records was completed no prior neurological history    Meds/Allergies   all current active meds have been reviewed, current meds:   Current Facility-Administered  "Medications   Medication Dose Route Frequency    acetaminophen (TYLENOL) tablet 650 mg  650 mg Oral Q6H PRN    aspirin (ECOTRIN LOW STRENGTH) EC tablet 81 mg  81 mg Oral Daily    atorvastatin (LIPITOR) tablet 40 mg  40 mg Oral QPM    ondansetron (ZOFRAN) injection 4 mg  4 mg Intravenous Q4H PRN   , and PTA meds:   Prior to Admission Medications   Prescriptions Last Dose Informant Patient Reported? Taking?   aspirin (Aspirin 81) 81 mg EC tablet   Yes Yes   Sig: Take 81 mg by mouth daily      Facility-Administered Medications: None     Allergies   Allergen Reactions    Medical Tape Hives     Objective   Vitals:Blood pressure 104/61, pulse 66, temperature (!) 97.4 °F (36.3 °C), temperature source Oral, resp. rate 14, height 5' 6\" (1.676 m), weight 80.7 kg (178 lb), SpO2 97%.,Body mass index is 28.73 kg/m².  No intake or output data in the 24 hours ending 03/15/24 1032    Invasive Devices:   Invasive Devices       Peripheral Intravenous Line  Duration             Peripheral IV 03/15/24 Distal;Right;Ventral (anterior) Forearm <1 day                  Vital Sign reviewed  Constitutional - in NAD, cooperative and pleasant, no meningismus.   HEENT - NC/AT, no icterus noted, conjunctiva clear, oral mucosa free of exudate  Cardiac - Rate regular  Lungs - No respiratory distress noted.   Abdomen - Soft and non tender, non distended  Extremities - No edema noted  Skin - no rashes noted  Neurological  Mental status - the patient is awake alert oriented x 3, with no evidence of aphasia or dysarthria, patient is able to follow simple commands, is able to follow complex commands.  No para-phasic errors noted.  The patient was able to tell us last 3 presidents.   She is able to read, repeat, recognize objects.   She is able to spell world forward and backwards.   She is able to tell us 7 subtracted backwards.   She is able to interpret a proverb.  She is able to name objects.  3/3 immediate, 3/3 intact  Cranial nerves 2 through 12 " "are intact - no nystagmus. VF intact.   Motor - 5/5 upper extremities and lower extremities, without drift, normal tone and bulk.  No drift in the upper or lowers, non focal motor examination.   No tremor or fixed deficit noted  Rapid movements are equal bilaterally  Sensation - non-focal to touch  Coordination -  no ataxia noted on finger-to-nose or heel-to-shin  Reflexes are equal - 2 in the uppers and lowers symmetric, except 3 at patellars bilaterally  Toes are down-going bilaterally  No evidence of clonus or myoclonus or tremor.  No evidence of seizure activity, observed.      (Examined alongside attending)    Lab Results: I have personally reviewed pertinent reports.  , CBC:   Results from last 7 days   Lab Units 03/14/24 2025   WBC Thousand/uL 8.09   RBC Million/uL 4.57   HEMOGLOBIN g/dL 13.9   HEMATOCRIT % 43.2   MCV fL 95   PLATELETS Thousands/uL 268   , BMP/CMP:   Results from last 7 days   Lab Units 03/14/24 2025   SODIUM mmol/L 140   POTASSIUM mmol/L 3.9   CHLORIDE mmol/L 104   CO2 mmol/L 29   BUN mg/dL 21   CREATININE mg/dL 0.73   CALCIUM mg/dL 9.7   EGFR ml/min/1.73sq m 86   , Vitamin B12:   , HgBA1C:   Results from last 7 days   Lab Units 03/14/24 2025   HEMOGLOBIN A1C % 5.5   , TSH:   , Coagulation:   Results from last 7 days   Lab Units 03/14/24  2132   INR  0.94   , Lipid Profile:   Results from last 7 days   Lab Units 03/15/24  0414   HDL mg/dL 43*   LDL CALC mg/dL 94   TRIGLYCERIDES mg/dL 161*   , Ammonia:   , Urinalysis:   Results from last 7 days   Lab Units 03/15/24  0600   COLOR UA  Yellow   CLARITY UA  Clear   SPEC GRAV UA  1.020   PH UA  6.0   LEUKOCYTES UA  Negative   NITRITE UA  Negative   GLUCOSE UA mg/dl Negative   KETONES UA mg/dl Negative   BILIRUBIN UA  Negative   BLOOD UA  Negative   , Drug Screen:   , Medication Drug Levels:       Invalid input(s): \"CARBAMAZEPINE\", \"LACOSAMIDE\", \"OXCARBAZEPINE\"    Per radiology interpretation-  \"  Procedure: CT stroke alert brain    Result Date: " 3/14/2024  Narrative: CT BRAIN - STROKE ALERT PROTOCOL INDICATION:   Stroke Alert. COMPARISON:  None. TECHNIQUE:  CT examination of the brain was performed.  In addition to axial images, coronal reformatted images were created and submitted for interpretation. Radiation dose length product (DLP) for this visit: .  This examination, like all CT scans performed in the [facility], was performed utilizing techniques to minimize radiation dose exposure, including the use of iterative reconstruction  and automated exposure control. IMAGE QUALITY:  Diagnostic. FINDINGS: PARENCHYMA: Periventricular and subcortical hypoattenuating foci consistent with mild microangiopathic disease. No acute intracranial hemorrhage or mass effect. VENTRICLES AND EXTRA-AXIAL SPACES: No hydrocephalus or extra-axial collection. VISUALIZED ORBITS: Intact. PARANASAL SINUSES: Small mucous retention cyst in the right maxillary sinus. CALVARIUM AND EXTRACRANIAL SOFT TISSUES:   No lytic or blastic lesion or fracture.     Impression: No evidence of acute vascular territorial infarction, intracranial hemorrhage or mass. Findings were directly discussed with at approximately . Workstation performed: [id number]     Procedure: CTA stroke alert (head/neck)    Result Date: 3/14/2024  Narrative: CTA NECK AND BRAIN WITH CONTRAST INDICATION: Stroke Alert COMPARISON:   None. TECHNIQUE:   Post contrast imaging was performed after administration of iodinated contrast through the neck and brain. Post contrast axial 0.625 mm images timed to opacify the arterial system. 3D rendering was performed on an independent workstation.   MIP reconstructions performed. Coronal reconstructions were performed of the noncontrast portion of the brain. Radiation dose length product (DLP) for this visit: .  This examination, like all CT scans performed in the [facility], was performed utilizing techniques to minimize radiation dose exposure,  including the use of iterative reconstruction  and automated exposure control. IV Contrast: IMAGE QUALITY:   Diagnostic FINDINGS: CERVICAL VASCULATURE AORTIC ARCH AND GREAT VESSELS: Three-vessel configuration aortic arch. No stenosis in the subclavian arteries. RIGHT VERTEBRAL ARTERY CERVICAL SEGMENT:  Normal origin. The vessel is normal in caliber throughout the neck. LEFT VERTEBRAL ARTERY CERVICAL SEGMENT:  Normal origin. The vessel is normal in caliber throughout the neck. RIGHT EXTRACRANIAL CAROTID SEGMENT:  Normal caliber common carotid artery.  Normal bifurcation and cervical internal carotid artery.  No stenosis or dissection. LEFT EXTRACRANIAL CAROTID SEGMENT:  Normal caliber common carotid artery.  Normal bifurcation and cervical internal carotid artery.  No stenosis or dissection. NASCET criteria was used to determine the degree of internal carotid artery diameter stenosis. INTRACRANIAL VASCULATURE INTERNAL CAROTID ARTERIES: No stenosis in the carotid siphons. ANTERIOR CIRCULATION: Hypoplastic right A1 segment. No stenosis in the anterior cerebral arteries. MIDDLE CEREBRAL ARTERY CIRCULATION:  M1 segment and middle cerebral artery branches demonstrate normal enhancement bilaterally. DISTAL VERTEBRAL ARTERIES:  Normal distal vertebral arteries.  Posterior inferior cerebellar arteries are patent. BASILAR ARTERY:  Basilar artery is normal in caliber. Patent superior cerebellar arteries. POSTERIOR CEREBRAL ARTERIES: Fetal-type right and nearly fetal-type left posterior cerebral arteries without stenosis. VENOUS STRUCTURES: No evidence of dural venous sinus thrombosis. NON VASCULAR ANATOMY BONY STRUCTURES:  No no lytic or blastic lesion. SOFT TISSUES OF THE NECK: No mass or lymphadenopathy. THORACIC INLET: Clear lung apices.     Impression: No hemodynamically significant stenosis, dissection or occlusion of the carotid or vertebral arteries or major vessels of the [place]. Findings were directly  "discussed with  Geneva Bansal   at  10:00 PM  . Workstation performed: [id number]  \"     Imaging Studies: I have personally reviewed pertinent reports.    EKG, Pathology, and Other Studies: I have personally reviewed pertinent reports.      Code Status: Level 1 - Full Code    Counseling / Coordination of Care  Reviewed case with neurology attending, history and physical examination, labs and imaging completed, plan of care as per attending physician.    Please see attestation for further details.    Examined alongside attending physician.    Reviewed with  at bedside.    "

## 2024-03-15 NOTE — ED NOTES
Ct has another pt on table. Waiting to transport pt to ct.      Alethea Peterson, RN  03/14/24 8859

## 2024-03-15 NOTE — PHYSICAL THERAPY NOTE
PHYSICAL THERAPY SCREEN NOTE      Patient Name: Christi Wallace  Today's Date: 3/15/2024       03/15/24 4485   Note Type   Note type Screen   Additional Comments PT orders received, chart review performed. Contact made w/ patient and  who both report pt is taking herself to/from bathroom and has no mobility concerns, state her cognition and memory are much improved from yetserday. No concerns w/ DC home, will DC PT       Kelsi Tapia, PT, DPT

## 2024-03-15 NOTE — CASE MANAGEMENT
Case Management Assessment & Discharge Planning Note    Patient name Christi Wallace  Location /-01 MRN [id number]  : 1960 Date 3/15/2024       Current Admission Date: 3/14/2024  Current Admission Diagnosis:Altered mental status - suspect TGA (transient global amenesia)   Patient Active Problem List    Diagnosis Date Noted    Altered mental status - suspect TGA (transient global amenesia) 03/15/2024      LOS (days): 0  Geometric Mean LOS (GMLOS) (days):   Days to GMLOS:     OBJECTIVE:              Current admission status: Observation       Preferred Pharmacy:   [id number] - [place] - [address]  [address] [address]  Phone: [phone] Fax: [phone]    Primary Care Provider: Sona Schwartz MD    Primary Insurance: MEDICARE  Secondary Insurance: COMMERCIAL MISCELLANEOUS    ASSESSMENT:  Active Health Care Proxies       Stanton Wallace Health Care Representative - Spouse   Primary Phone: [phone] (Mobile)                 Advance Directives  Does patient have a Health Care POA?: No  Was patient offered paperwork?: Yes (declined)  Does patient currently have a Health Care decision maker?: Yes, please see Health Care Proxy section  Does patient have Advance Directives?: No  Was patient offered paperwork?: Yes (declined)    Readmission Root Cause  30 Day Readmission: No    Patient Information  Admitted from:: Home  Mental Status: Alert  During Assessment patient was accompanied by: Not accompanied during assessment  Assessment information provided by:: Patient  Primary Caregiver: Self  Support Systems: Self, Spouse/significant other, Daughter  Home entry access options. Select all that apply.: Stairs  Number of steps to enter home.: 3  Type of Current Residence: 2 story home  Upon entering residence, is there a bedroom on the main floor (no further steps)?: No  A bedroom is located on the following floor levels of residence (select  all that apply):: 2nd Floor  Upon entering residence, is there a bathroom on the main floor (no further steps)?: Yes  Number of steps to 2nd floor from main floor: One Flight  Living Arrangements: Lives w/ Spouse/significant other  Is patient a ?: No    Activities of Daily Living Prior to Admission  Functional Status: Independent  Completes ADLs independently?: Yes  Ambulates independently?: Yes  Does patient use assisted devices?: No  Does patient currently own DME?: No  Does patient have a history of Outpatient Therapy (PT/OT)?: No  Does the patient have a history of Short-Term Rehab?: No  Does patient have a history of HHC?: No  Does patient currently have HHC?: No    Patient Information Continued  Does patient have prescription coverage?: Yes  Does patient receive dialysis treatments?: No  Does patient have a history of substance abuse?: No  Does patient have a history of Mental Health Diagnosis?: No    Means of Transportation  Means of Transport to Appts:: Drives Self      Social Determinants of Health (SDOH)      Flowsheet Row Most Recent Value   Housing Stability    In the last 12 months, was there a time when you were not able to pay the mortgage or rent on time? N   In the last 12 months, how many places have you lived? 1   In the last 12 months, was there a time when you did not have a steady place to sleep or slept in a shelter (including now)? N   Transportation Needs    In the past 12 months, has lack of transportation kept you from medical appointments or from getting medications? no   In the past 12 months, has lack of transportation kept you from meetings, work, or from getting things needed for daily living? No   Food Insecurity    Within the past 12 months, you worried that your food would run out before you got the money to buy more. Never true   Within the past 12 months, the food you bought just didn't last and you didn't have money to get more. Never true   Utilities    In the past 12  months has the electric, gas, oil, or water company threatened to shut off services in your home? No          DISCHARGE DETAILS:    Discharge planning discussed with:: patient  Freedom of Choice: Yes  Comments - Freedom of Choice: no anticipated dc needs  CM contacted family/caregiver?: No- see comments (reports being in contact with family)  Were Treatment Team discharge recommendations reviewed with patient/caregiver?: Yes  Did patient/caregiver verbalize understanding of patient care needs?: Yes  Were patient/caregiver advised of the risks associated with not following Treatment Team discharge recommendations?: Yes    Requested Home Health Care         Is the patient interested in HHC at discharge?: No    DME Referral Provided  Referral made for DME?: No    Treatment Team Recommendation: Home  Discharge Destination Plan:: Home  Transport at Discharge : Family     Additional Comments: Met with pt to discuss the role of CM and to discuss any help pt may need prior to dc. Pt lives with her  Stanton in a 2 story home with 3 JOSE LUIS; bedroom on the 2nd floor. Pt performed ADL's indptly pta, no use of DME. No hx of HHC or rehab. No hx of mental health or D&A treatment. Pt's preferred pharmacy is [email or web address]. Pt drives. Pt's daughter Desirae will transport home at dc.

## 2024-03-15 NOTE — H&P
[facility]  H&P  Name: Christi Wallace 63 y.o. female I MRN: [id number]  Unit/Bed#: -01 I Date of Admission: 3/14/2024   Date of Service: 3/15/2024 I Hospital Day: 0      Assessment/Plan   * Altered mental status  Assessment & Plan  Presented to [facility] at about 1900 on 3/14 due to sudden confusion. Per  at bedside patient at baseline throughout the day. No recent illness other than covid in January. Last known normal when she was outside tending to weeds around a bush. Had only been out there for about 20 minutes.   Initially in the ER very confused. Unable to recall what had happened, where she was, year or president. Over the next couple of hours confusion waxing and waning.   At time of admission oriented to person, place and year. States jorge is president. Unable to recall being in the ER or being at home tonight. Remebers tending to the bush by her house.   Takes no medications other than aspirin 81 mg.   Family history of CVA. No prior CVA or tia herself.   CTA head and neck   No hemodynamically significant stenosis, dissection or occlusion of the carotid or vertebral arteries or major vessels of the [place] of [place].   ED contacted neurology concern for TGA vs hippocampal stroke   Recommended loading with aspirin 325 mg, then 81 mg once   Allow for permissive HTN   Stroke pathway (MRI brain/ECHO)  Denies tobacco or alcohol abuse   Labs unremarkable. Does not appear infections. Obtain UA   Covid/flu/rsv negative  Neuro checks   Continue to reorient as able  Consult CM and neuro           VTE Pharmacologic Prophylaxis: VTE Score: 2 Low Risk (Score 0-2) - Encourage Ambulation.  Code Status: Level 1 - Full Code   Discussion with family: Updated  ( and daughter) at bedside.    Anticipated Length of Stay: Patient will be admitted on an observation basis with an anticipated length of stay of less than 2 midnights secondary to AMS.    Total Time Spent on  Date of Encounter in care of patient: 55 mins. This time was spent on one or more of the following: performing physical exam; counseling and coordination of care; obtaining or reviewing history; documenting in the medical record; reviewing/ordering tests, medications or procedures; communicating with other healthcare professionals and discussing with patient's family/caregivers.    Chief Complaint: Confused     History of Present Illness:  Christi Wallace is a 63 y.o. female with no PMH who presents from home with  due to acute confusion.  Per  at bedside patient had been at her baseline throughout the day.  No recent illness other than COVID in January.  Patient was last noted to be normal a little bit before 6 when she went outside to tend to bushes and pull out weeds by their house.  She was only outside for approximately 20 minutes when the confusion began.  Per  confusion was severe, initially unable to talk or provide any input.  In the ER unable to recall current year, president or where she was.  Confused of why she was there.  Per family at bedside patient was also confused about her age, thought she was 58 years old instead of 63.  Stroke alert in the ER.  CT HEAD AND CTA without acute signs of stroke/dissection/occlusion.  Neurology recommending stroke pathway.     At time of admission she is now oriented to person, place and year.  States Rod is president.  However unable to recall being in the ER, meeting ED provider or being at home tonight.  Remembers tending to the bushes by her house that is the last thing.  Per family her confusion appears to be waxing and waning throughout the night.  No prior cognitive issues.  No prior TIAs/CVAs.     No new medications.  Takes aspirin 81 mg daily.    Denies tobacco or alcohol abuse.     Review of Systems:  Review of Systems   Constitutional:  Negative for fatigue and fever.   HENT:  Negative for sore throat.    Respiratory:  Negative for  "cough, chest tightness and shortness of breath.    Cardiovascular:  Negative for chest pain.   Gastrointestinal:  Negative for abdominal distention, abdominal pain, diarrhea, nausea and vomiting.   Genitourinary:  Negative for difficulty urinating.   Musculoskeletal:  Negative for arthralgias.   Neurological:  Negative for weakness and headaches.   Psychiatric/Behavioral:  Positive for confusion. Negative for agitation and behavioral problems.    All other systems reviewed and are negative.      Past Medical and Surgical History:   History reviewed. No pertinent past medical history.    History reviewed. No pertinent surgical history.    Meds/Allergies:  Prior to Admission medications    Medication Sig Start Date End Date Taking? Authorizing Provider   aspirin (Aspirin 81) 81 mg EC tablet Take 81 mg by mouth daily   Yes Historical Provider, MD DUKES have reviewed home medications with patient personally.    Allergies:   Allergies   Allergen Reactions    Medical Tape Hives       Social History:  Marital Status: /Civil Union   Occupation: Unknown  Patient Pre-hospital Living Situation: Home  Patient Pre-hospital Level of Mobility: walks  Patient Pre-hospital Diet Restrictions: Regular  Substance Use History:   Social History     Substance and Sexual Activity   Alcohol Use Not Currently     Social History     Tobacco Use   Smoking Status Never   Smokeless Tobacco Never     Social History     Substance and Sexual Activity   Drug Use Never       Family History:  History reviewed. No pertinent family history.    Physical Exam:     Vitals:   Blood Pressure: 138/60 (03/15/24 0007)  Pulse: 64 (03/15/24 0007)  Temperature: 97.9 °F (36.6 °C) (03/15/24 0007)  Temp Source: Oral (03/14/24 2023)  Respirations: 20 (03/14/24 2315)  Height: 5' 6\" (167.6 cm) (03/14/24 2357)  Weight - Scale: 80.7 kg (178 lb) (03/14/24 2357)  SpO2: 97 % (03/15/24 0007)    Physical Exam  Vitals and nursing note reviewed.   Constitutional:       " Appearance: Normal appearance.   HENT:      Head: Normocephalic.   Eyes:      Extraocular Movements: Extraocular movements intact.      Pupils: Pupils are equal, round, and reactive to light.   Cardiovascular:      Rate and Rhythm: Normal rate and regular rhythm.      Heart sounds: No murmur heard.  Pulmonary:      Effort: Pulmonary effort is normal. No respiratory distress.      Breath sounds: Normal breath sounds. No wheezing.   Abdominal:      General: Bowel sounds are normal. There is no distension.      Tenderness: There is no abdominal tenderness. There is no guarding.   Musculoskeletal:         General: Normal range of motion.      Cervical back: Normal range of motion.      Right lower leg: No edema.      Left lower leg: No edema.   Skin:     General: Skin is warm.   Neurological:      General: No focal deficit present.      Mental Status: She is alert. She is disoriented.   Psychiatric:         Mood and Affect: Mood normal.         Speech: Speech normal.         Behavior: Behavior normal.         Cognition and Memory: She exhibits impaired recent memory. She does not exhibit impaired remote memory.          Additional Data:     Lab Results:  Results from last 7 days   Lab Units 03/14/24 2025   WBC Thousand/uL 8.09   HEMOGLOBIN g/dL 13.9   HEMATOCRIT % 43.2   PLATELETS Thousands/uL 268     Results from last 7 days   Lab Units 03/14/24 2025   SODIUM mmol/L 140   POTASSIUM mmol/L 3.9   CHLORIDE mmol/L 104   CO2 mmol/L 29   BUN mg/dL 21   CREATININE mg/dL 0.73   ANION GAP mmol/L 7   CALCIUM mg/dL 9.7   GLUCOSE RANDOM mg/dL 88     Results from last 7 days   Lab Units 03/14/24  2132   INR  0.94     Results from last 7 days   Lab Units 03/14/24 2024   POC GLUCOSE mg/dl 96               Lines/Drains:  Invasive Devices       Peripheral Intravenous Line  Duration             Peripheral IV 03/15/24 Distal;Right;Ventral (anterior) Forearm <1 day                        Imaging: Reviewed radiology reports from this  admission including: CT head  CT stroke alert brain   Final Result by Osmel Padgett MD (03/14 2202)      No evidence of acute vascular territorial infarction, intracranial hemorrhage or mass.      Findings were directly discussed with at approximately .      Workstation performed: [id number]         CTA stroke alert (head/neck)   Final Result by Osmel Padgett MD (03/14 2202)      No hemodynamically significant stenosis, dissection or occlusion of the carotid or vertebral arteries or major vessels of the [place].            Findings were directly discussed with  Geneva Bansal   at  10:00 PM  .                           Workstation performed: [id number]         X-ray chest 1 view portable   ED Interpretation by Harry Gutierrez DO (03/14 2106)   My X-ray interpretation of the Chest: was negative for infiltrate, effusion, pneumothorax, or wide mediastinum      MRI Inpatient Order    (Results Pending)       EKG and Other Studies Reviewed on Admission:   EKG:  Ordered.    ** Please Note: This note has been constructed using a voice recognition system. **

## 2024-03-15 NOTE — OCCUPATIONAL THERAPY NOTE
Occupational Therapy Screen Note     Patient Name: Christi Wallace  Today's Date: 3/15/2024  Problem List  Principal Problem:    Altered mental status - suspect TGA (transient global amenesia)              03/15/24 1416   OT Last Visit   OT Visit Date 03/15/24   Note Type   Note type Screen   Additional Comments OT orders received, chart reviewed. Spoke with pt &  directly. Pt reports taking self <> BR. Pt reports no concerns for going home & completing ADLs & functional txfers/mobility independently. Pt reports her memory feels significantly better. DC OT Orders.       Flora Hartmann, OTR/L

## 2024-03-15 NOTE — ASSESSMENT & PLAN NOTE
Presented to [facility] at about 1900 on 3/14 due to sudden confusion. Per  at bedside patient at baseline throughout the day. No recent illness other than covid in January. Last known normal when she was outside tending to weeds around a bush. Had only been out there for about 20 minutes.   Initially in the ER very confused. Unable to recall what had happened, where she was, year or president. Over the next couple of hours confusion waxing and waning.   At time of admission oriented to person, place and year. States jorge is president. Unable to recall being in the ER or being at home tonight. Remebers tending to the bush by her house.   Takes no medications other than aspirin 81 mg.   Family history of CVA. No prior CVA or tia herself.   CTA head and neck   No hemodynamically significant stenosis, dissection or occlusion of the carotid or vertebral arteries or major vessels of the [place] of [place].   ED contacted neurology concern for TGA vs hippocampal stroke   Recommended loading with aspirin 325 mg, then 81 mg once   Allow for permissive HTN   Stroke pathway (MRI brain/ECHO)  Denies tobacco or alcohol abuse   Labs unremarkable. Does not appear infections. Obtain UA   Covid/flu/rsv negative  Neuro checks   Continue to reorient as able  Consult CM and neuro

## 2024-03-15 NOTE — PLAN OF CARE
Problem: PAIN - ADULT  Goal: Verbalizes/displays adequate comfort level or baseline comfort level  Description: Interventions:  - Encourage patient to monitor pain and request assistance  - Assess pain using appropriate pain scale  - Administer analgesics based on type and severity of pain and evaluate response  - Implement non-pharmacological measures as appropriate and evaluate response  - Consider cultural and social influences on pain and pain management  - Notify physician/advanced practitioner if interventions unsuccessful or patient reports new pain  Outcome: Progressing     Problem: INFECTION - ADULT  Goal: Absence or prevention of progression during hospitalization  Description: INTERVENTIONS:  - Assess and monitor for signs and symptoms of infection  - Monitor lab/diagnostic results  - Monitor all insertion sites, i.e. indwelling lines, tubes, and drains  - Monitor endotracheal if appropriate and nasal secretions for changes in amount and color  - [place] appropriate cooling/warming therapies per order  - Administer medications as ordered  - Instruct and encourage patient and family to use good hand hygiene technique  - Identify and instruct in appropriate isolation precautions for identified infection/condition  Outcome: Progressing  Goal: Absence of fever/infection during neutropenic period  Description: INTERVENTIONS:  - Monitor WBC    Outcome: Progressing     Problem: SAFETY ADULT  Goal: Patient will remain free of falls  Description: INTERVENTIONS:  - Educate patient/family on patient safety including physical limitations  - Instruct patient to call for assistance with activity   - Consult OT/PT to assist with strengthening/mobility   - Keep Call bell within reach  - Keep bed low and locked with side rails adjusted as appropriate  - Keep care items and personal belongings within reach  - Initiate and maintain comfort rounds  - Make Fall Risk Sign visible to staff  - Offer Toileting every x Hours,  in advance of need  - Initiate/Maintain xxalarm  - Obtain necessary fall risk management equipment: x  - Apply yellow socks and bracelet for high fall risk patients  - Consider moving patient to room near nurses station  Outcome: Progressing  Goal: Maintain or return to baseline ADL function  Description: INTERVENTIONS:  -  Assess patient's ability to carry out ADLs; assess patient's baseline for ADL function and identify physical deficits which impact ability to perform ADLs (bathing, care of mouth/teeth, toileting, grooming, dressing, etc.)  - Assess/evaluate cause of self-care deficits   - Assess range of motion  - Assess patient's mobility; develop plan if impaired  - Assess patient's need for assistive devices and provide as appropriate  - Encourage maximum independence but intervene and supervise when necessary  - Involve family in performance of ADLs  - Assess for home care needs following discharge   - Consider OT consult to assist with ADL evaluation and planning for discharge  - Provide patient education as appropriate  Outcome: Progressing  Goal: Maintains/Returns to pre admission functional level  Description: INTERVENTIONS:  - Perform AM-PAC 6 Click Basic Mobility/ Daily Activity assessment daily.  - Set and communicate daily mobility goal to care team and patient/family/caregiver.   - Collaborate with rehabilitation services on mobility goals if consulted  - Perform Range of Motion x times a day.  - Reposition patient every x hours.  - Dangle patient x times a day  - Stand patient x times a day  - Ambulate patient x times a day  - Out of bed to chair x times a day   - Out of bed for meals x times a day  - Out of bed for toileting  - Record patient progress and toleration of activity level   Outcome: Progressing     Problem: DISCHARGE PLANNING  Goal: Discharge to home or other facility with appropriate resources  Description: INTERVENTIONS:  - Identify barriers to discharge w/patient and caregiver  -  Arrange for needed discharge resources and transportation as appropriate  - Identify discharge learning needs (meds, wound care, etc.)  - Arrange for interpretive services to assist at discharge as needed  - Refer to Case Management Department for coordinating discharge planning if the patient needs post-hospital services based on physician/advanced practitioner order or complex needs related to functional status, cognitive ability, or social support system  Outcome: Progressing     Problem: Knowledge Deficit  Goal: Patient/family/caregiver demonstrates understanding of disease process, treatment plan, medications, and discharge instructions  Description: Complete learning assessment and assess knowledge base.  Interventions:  - Provide teaching at level of understanding  - Provide teaching via preferred learning methods  Outcome: Progressing

## 2024-04-17 ENCOUNTER — TELEPHONE (OUTPATIENT)
Dept: NEUROLOGY | Facility: CLINIC | Age: 64
End: 2024-04-17

## 2024-04-17 NOTE — TELEPHONE ENCOUNTER
Patient has asked for an appt in August 12th or later and also in ctr vl only. Mailed appt card.    Scheduled with , 8/12/2024, 11am.          HFU/ SL / Altered mental status - suspect TGA     DC- 3/14/2024- HOME    Christi Wallace will need follow up in in 6 weeks with neurovascular attending. She will not require outpatient neurological testing.

## 2024-08-12 ENCOUNTER — OFFICE VISIT (OUTPATIENT)
Dept: NEUROLOGY | Facility: CLINIC | Age: 64
End: 2024-08-12
Payer: MEDICARE

## 2024-08-12 VITALS
TEMPERATURE: 98.2 F | WEIGHT: 171 LBS | SYSTOLIC BLOOD PRESSURE: 129 MMHG | HEART RATE: 60 BPM | DIASTOLIC BLOOD PRESSURE: 64 MMHG | BODY MASS INDEX: 27.48 KG/M2 | HEIGHT: 66 IN

## 2024-08-12 DIAGNOSIS — F32.A DEPRESSION: ICD-10-CM

## 2024-08-12 DIAGNOSIS — F43.9 HIGH PERCEIVED STRESS: ICD-10-CM

## 2024-08-12 DIAGNOSIS — G45.4 TRANSIENT GLOBAL AMNESIA: Primary | ICD-10-CM

## 2024-08-12 PROCEDURE — 99215 OFFICE O/P EST HI 40 MIN: CPT | Performed by: STUDENT IN AN ORGANIZED HEALTH CARE EDUCATION/TRAINING PROGRAM

## 2024-08-12 RX ORDER — OMEGA-3/DHA/EPA/FISH OIL 60 MG-90MG
1150 CAPSULE ORAL DAILY
COMMUNITY

## 2024-08-12 RX ORDER — MULTIVIT-MIN/IRON/FOLIC ACID/K 18-600-40
1 CAPSULE ORAL DAILY
COMMUNITY

## 2024-08-12 RX ORDER — ASPIRIN 81 MG/1
81 TABLET, CHEWABLE ORAL DAILY
COMMUNITY

## 2024-08-12 NOTE — PROGRESS NOTES
Review of Systems   Constitutional:  Negative for appetite change and fever.   HENT: Negative.  Negative for hearing loss, tinnitus, trouble swallowing and voice change.    Eyes: Negative.  Negative for photophobia and pain.   Respiratory: Negative.  Negative for shortness of breath.    Cardiovascular: Negative.  Negative for palpitations.   Gastrointestinal:  Negative for nausea and vomiting.   Endocrine: Negative.  Negative for cold intolerance.   Genitourinary: Negative.  Negative for dysuria, frequency and urgency.   Musculoskeletal: Negative.  Negative for myalgias and neck pain.   Skin: Negative.  Negative for rash.   Neurological:  Positive for weakness (only during episodes) and light-headedness. Negative for dizziness, tremors, seizures, syncope, facial asymmetry, speech difficulty, numbness and headaches.   Hematological: Negative.  Does not bruise/bleed easily.   Psychiatric/Behavioral:  Positive for confusion and sleep disturbance. Negative for hallucinations.

## 2024-08-12 NOTE — PATIENT INSTRUCTIONS
-I do agree that your presentation is closest to what is called Transient Global Amnesia. This is a transient episode primarily of difficulty forming new memories, however there can be a degree of retrograde amnesia as well, sometimes incorporating as long as a year. This is often brought on by a stressful or otherwise significant life event.  -These episodes rarely (if ever) recur, and even if it does recur, it is usually just once.  -I will order an EEG out of an abundance of caution, as we discussed.    -I would greatly recommend establishing with a psychologist/counselor, or even possibly with a Psychiatrist, although I would prefer the former initially. I will place a referral, but you can also talk to your PCP about alternative local psychologists.

## 2024-08-12 NOTE — PROGRESS NOTES
[facility]  [facility] Follow-up Note    Patient Name: Christi Wallace  : 1960    MRN: [id number]        Assessment/Plan:  1. Transient global amnesia  EEG awake or drowsy routine    Ambulatory referral to Psych Services      2. High perceived stress  Ambulatory referral to Psych Services      3. Depression  Ambulatory referral to Psych Services        Christi is a very pleasant 63-year-old woman with PMH depression presenting for hospital follow-up after being seen for suspected transient global amnesia.  She is accompanied by her  today.  While it is slightly unusual, it is not unheard of for the retrograde amnesia associated with TGA to span years; indeed, she does not have a complete loss of memory over the previous 5 years, but rather it sounds to be somewhat spotty/inconsistent.  Moreover, the description of her symptoms during the event in question do sound classic for TGA, specifically the anterograde amnesia.  The odd episodes since then also do not sound primarily neurologic; I briefly entertained the possibility of a focal seizures, however the fact that these are fairly consistently triggered by stressful situations argue more towards an acute stress response.    Reviewed the benign nature of TGA and the very low risk of recurrence, as well as the high chance of full recovery (albeit not necessarily of memories during the event itself).  For the sake of an abundance of caution, will obtain an EEG to evaluate for any abnormal brain waves  I do not feel that additional neuroimaging is needed at this time  I did encourage her to continue to follow-up with cardiology regarding these suppose it episodes of bradycardia and generalized weakness.  I also encouraged her to try to establish with psychiatry and/or, possibly more importantly, psychology.   If the EEG is abnormal, we will reach out to her regarding this result and next steps, including the  "possibility of any antiseizure medication; otherwise She will Return if symptoms worsen or fail to improve.                                                                    Thank you for allowing me to participate in the care of your patient.  If there are any questions regarding evaluation please feel free to reach out.       ________________________________________________________________________________________________    Subjective:  Chief Complaint   Patient presents with    Memory Loss       63 y.o. female with PMH depression presenting for hospital follow-up after being seen for suspected transient global amnesia.      She tells me today that the even that prompted the hospitalization was that she \"just went blank.\" Her  found her in the yard, and she seemed confused, didn't know her age, didn't recognize the pants she had on, confused about many things. She herself remembers going from the ED up to a hospital room, but then is still somewhat spotty up until \"weeks later.\" She says she was somewhat \"rediscovering things in the house;\" would remember if her daughter or someone reminded her. This lasted for several weeks, steadily getting better.      says \"it's like she lost five years,\" although this is still somewhat spotty (will remember certain things from within the past 5 years; more things that centered around her daughter are what she forgot. Of note, it was also hot that day, and patient was crouched underneath the deck trying to dig something out.  is quite adamant that it seemed as though she could not make new memories during the acute event.      Does endorse more stress in March - daughter thinking of having wedding at the patient's property, a lot of planning, a lot of yard work. Mom also has doubts about who her daughter is with. In addition,  had a major surgery around the same time.     Everything has come back since (as far as they can tell; once in a while " "there will be something else that comes up, and is then refreshed in her mind).       Since that event:  -Most of the time she is fine; however, is having episodes.  and she both endorse that \"stress triggers her to shut down.\" During the episodes, she says \"something's not right.\" Has trouble following conversations.    -Lasts for a few seconds   -Couple times per week.   -Unchanged in frequency since the episode.   -They do state that she will sometimes get bradycardic and feels \"weak, like I'm just fading.\"    -1-2 hours   -Daily   -Unclear trigger  -These two things may coincide, but it is not consistent.  sees them \"tied together more than she does.\"     -Believes stress level is still high. She says \"what my kids do really affects me.\" Her older sister also has Down Syndrome, and Christi is the next oldest; as a child, parents may go out and keep her in charge. Endorses that a lot of things happening with her sister with DS (in a home) is falling on me. A lot of familial stress. Has never seen Psychiatry or psychology.   --Was diagnosed with depression in the past, was on medications, but had bad side effects.           Current Outpatient Medications   Medication Sig Dispense Refill    aspirin 81 mg chewable tablet Chew 81 mg daily      Cholecalciferol (Vitamin D) 50 MCG (2000 UT) CAPS Take 1 capsule by mouth in the morning      Cyanocobalamin (VITAMIN B 12 PO) Take 1 tablet by mouth if needed      Misc Natural Products (LUTEIN 20 PO) Take 1 tablet by mouth in the morning      Omega-3 Fatty Acids (Fish Oil) 500 MG CAPS Take 1,150 mg by mouth in the morning       No current facility-administered medications for this visit.       Allergies   Allergen Reactions    Medical Tape Hives       Past Medical History:   Diagnosis Date    Depression     Transient global amnesia     :   History reviewed. No pertinent surgical history.  Social History     Socioeconomic History    Marital status: /Civil " Union     Spouse name: Not on file    Number of children: Not on file    Years of education: Not on file    Highest education level: Not on file   Occupational History    Not on file   Tobacco Use    Smoking status: Never    Smokeless tobacco: Never   Vaping Use    Vaping status: Never Used   Substance and Sexual Activity    Alcohol use: Not Currently    Drug use: Never    Sexual activity: Yes     Partners: Male   Other Topics Concern    Not on file   Social History Narrative    Not on file     Social Determinants of Health     Financial Resource Strain: Not on file   Food Insecurity: No Food Insecurity (3/15/2024)    Hunger Vital Sign     Worried About Running Out of Food in the Last Year: Never true     Ran Out of Food in the Last Year: Never true   Transportation Needs: No Transportation Needs (3/15/2024)    PRAPARE - Transportation     Lack of Transportation (Medical): No     Lack of Transportation (Non-Medical): No   Physical Activity: Not on file   Stress: Not on file   Social Connections: Not on file   Intimate Partner Violence: Not on file   Housing Stability: Low Risk  (3/15/2024)    Housing Stability Vital Sign     Unable to Pay for Housing in the Last Year: No     Number of Times Moved in the Last Year: 1     Homeless in the Last Year: No      Family History   Problem Relation Age of Onset    Diabetes Mother     Parkinsonism Father     Diabetes Father     Bone cancer Father     Prostate cancer Father     Stroke Brother        Review of Symptoms:      10-point system review completed, all of which are negative except as mentioned above.        Imaging/Procedures:   MRI brain without contrast 3/15/2024:  IMPRESSION:     1. Mild, chronic microangiopathy.  2. No acute infarction, intracranial hemorrhage or midline shift  2. Small retrocerebellar arachnoid cyst on the left.      CTA stroke alert head neck 3/14/2024:  IMPRESSION:     No hemodynamically significant stenosis, dissection or occlusion of the  "carotid or vertebral arteries or major vessels of the [place].      CT stroke alert brain 3/14/2024:  IMPRESSION:     No evidence of acute vascular territorial infarction, intracranial hemorrhage or mass.      Objective:  Physical Exam:      /64 (BP Location: Right arm, Patient Position: Sitting, Cuff Size: Standard)   Pulse 60   Temp 98.2 °F (36.8 °C) (Temporal)   Ht 5' 6\" (1.676 m)   Wt 77.6 kg (171 lb)   BMI 27.60 kg/m²      Gen: A&O x 4, NAD, cooperative  HEENT: NC/AT, EOMI, PERRL, mmm, no carotid bruits, neck supple, no meningeal signs  Chest: No evidence of respiratory distress, no accessory muscle use; no evidence of peripheral cyanosis  Abd: soft/NT/ND, +BS  Ext: no edema, no calf tenderness b/l  Endo: no thyromegaly  Psych: no depression or anxiety apparent   Skin: no rashes or lesions    Neurologic Exam:  Mental Status: A&O x 4, NAD, speech clear, language fluent, comprehension intact, repetition and naming intact    Cranial Nerve Exam:   CN II-XII intact: No papilledema on fundoscopic examination, PERRL, VFF, no nystagmus, no gaze paresis, sensation V1-V3 intact b/l, muscles of facial expression symmetric; hearing intact to conversational tone, palate elevates symmetrically, shoulder elevation symmetric and tongue protrudes midline with movement side to side.    Motor Exam:       Strength 5/5 UE's/LE's b/l  Tone and bulk normal   No pronator drift    Deep Tendon Reflexes: 2/4 biceps, triceps, brachioradialis, patellar, and achilles b/l, flexor plantar responses b/l    Sensation: Intact light touch/temperature/pinprick/vibration UE's/LE's b/l    Coordination/Cerebellum:       Tremors--none      Rapidly alternating movements: no dysdiadochokinesia b/l                Heel-to-Shin: no dysmetria b/l      Finger-to-Nose: no dysmetria b/l    Gait and stance:      Gait: Normal casual, tiptoe, heel walk, and tandem gait.  No evidence of ataxia present.            I have spent a total time of " 60-63 minutes on 08/12/24 in caring for this patient including Diagnostic results, Prognosis, Risks and benefits of tx options, Instructions for management, Patient and family education, Importance of tx compliance, Risk factor reductions, Documenting in the medical record, Reviewing / ordering tests, medicine, procedures  , and Obtaining or reviewing history  .      Electronically signed by:    Taye Richardson DO  Board-Certified Neurology and Sleep Medicine  [facility]  08/12/24

## 2024-08-13 ENCOUNTER — TELEPHONE (OUTPATIENT)
Age: 64
End: 2024-08-13

## 2024-08-13 NOTE — TELEPHONE ENCOUNTER
Contacted patient in regards to Routine Referral in attempts to verify patient's needs of services and add patient to proper wait list.     Talk Therapy;[facility]

## 2024-10-10 ENCOUNTER — HOSPITAL ENCOUNTER (OUTPATIENT)
Dept: NEUROLOGY | Facility: CLINIC | Age: 64
End: 2024-10-10
Payer: MEDICARE

## 2024-10-10 DIAGNOSIS — G45.4 TRANSIENT GLOBAL AMNESIA: ICD-10-CM

## 2024-10-10 PROCEDURE — 95816 EEG AWAKE AND DROWSY: CPT | Performed by: PSYCHIATRY & NEUROLOGY

## 2024-10-10 PROCEDURE — 95816 EEG AWAKE AND DROWSY: CPT

## 2024-11-22 ENCOUNTER — TELEPHONE (OUTPATIENT)
Age: 64
End: 2024-11-22

## 2024-11-22 NOTE — TELEPHONE ENCOUNTER
Pts  Stanton called to ensure we had the correct insurance on file for this pt.       Confirmed primary is Medicare and secondary Commercial. ID numbers were confirmed.      Stanton stated she got an estimate in the mail from [employer] administrators and the pt has not had that insurance in years.      Provided the phone number to billing moises.

## 2025-03-10 ENCOUNTER — TELEPHONE (OUTPATIENT)
Age: 65
End: 2025-03-10

## 2025-03-10 NOTE — TELEPHONE ENCOUNTER
Contacted patient off of Talk Therapy  wait list to verify needs of services in attempts to update list with patient preferences. spoke with patient whom stated they  are still interested in services    TT  Insurance: medicare a and b  Insurance Type:    Commercial []   Medicaid []   [facility] (if applicable)   Medicare [x]  Location Preference: PF only  Provider Preference: female only  Virtual: Yes [x] No [] Open to VV  Were outside resources sent: Yes [] No [x]

## 2025-08-18 ENCOUNTER — TELEPHONE (OUTPATIENT)
Dept: PSYCHIATRY | Facility: CLINIC | Age: 65
End: 2025-08-18